# Patient Record
Sex: FEMALE | Race: BLACK OR AFRICAN AMERICAN | NOT HISPANIC OR LATINO | Employment: FULL TIME | URBAN - METROPOLITAN AREA
[De-identification: names, ages, dates, MRNs, and addresses within clinical notes are randomized per-mention and may not be internally consistent; named-entity substitution may affect disease eponyms.]

---

## 2018-05-26 ENCOUNTER — OFFICE VISIT (OUTPATIENT)
Dept: URGENT CARE | Facility: URGENT CARE | Age: 38
End: 2018-05-26
Payer: COMMERCIAL

## 2018-05-26 DIAGNOSIS — N94.6 DYSMENORRHEA: ICD-10-CM

## 2018-05-26 DIAGNOSIS — R35.0 URINARY FREQUENCY: Primary | ICD-10-CM

## 2018-05-26 LAB
ALBUMIN UR-MCNC: NEGATIVE MG/DL
APPEARANCE UR: CLEAR
BACTERIA #/AREA URNS HPF: ABNORMAL /HPF
BILIRUB UR QL STRIP: NEGATIVE
COLOR UR AUTO: YELLOW
GLUCOSE UR STRIP-MCNC: NEGATIVE MG/DL
HGB UR QL STRIP: ABNORMAL
KETONES UR STRIP-MCNC: NEGATIVE MG/DL
LEUKOCYTE ESTERASE UR QL STRIP: NEGATIVE
NITRATE UR QL: NEGATIVE
PH UR STRIP: 5.5 PH (ref 5–7)
RBC #/AREA URNS AUTO: ABNORMAL /HPF
SOURCE: ABNORMAL
SP GR UR STRIP: 1.02 (ref 1–1.03)
UROBILINOGEN UR STRIP-ACNC: 0.2 EU/DL (ref 0.2–1)
WBC #/AREA URNS AUTO: ABNORMAL /HPF

## 2018-05-26 PROCEDURE — 81001 URINALYSIS AUTO W/SCOPE: CPT | Performed by: PHYSICIAN ASSISTANT

## 2018-05-26 PROCEDURE — 99203 OFFICE O/P NEW LOW 30 MIN: CPT | Performed by: NURSE PRACTITIONER

## 2018-05-26 RX ORDER — CABERGOLINE 0.5 MG/1
0.25 TABLET ORAL
COMMUNITY
Start: 2018-01-25

## 2018-05-26 NOTE — PROGRESS NOTES
SUBJECTIVE:   Toan Mcpherson is a 37 year old female presenting with a chief complaint of   Chief Complaint   Patient presents with     Abdominal Pain     abdominal cramping and lower back pain since yesterday.      Urinary Frequency     urinary frequency for three weeks.        She is a new patient of [place].    UTI    Onset of symptoms was 3day(s).  Course of illness is same  Severity moderate  Current and associated symptoms dysuria, frequency and urgency  Treatment and measures tried None  Predisposing factors include none  Patient denies rigors, flank pain, temperature > 101 degrees F., vomiting, taking Coumadin and GFR less than 30 within the last year    Also started her period today     Review of Systems   All other systems reviewed and are negative.      No past medical history on file.  No family history on file.  Current Outpatient Prescriptions   Medication Sig Dispense Refill     cabergoline (DOSTINEX) 0.5 MG tablet Take 0.25 mg by mouth twice a week       Social History   Substance Use Topics     Smoking status: Never Smoker     Smokeless tobacco: Never Used     Alcohol use Not on file       OBJECTIVE  BP (P) 109/75  Pulse (P) 73  Temp (P) 97.9  F (36.6  C) (Oral)  Wt (P) 156 lb 8 oz (71 kg)    Physical Exam   Constitutional: She appears well-developed and well-nourished.   HENT:   Head: Normocephalic.   Eyes: EOM are normal. Pupils are equal, round, and reactive to light.   Cardiovascular: Normal rate, regular rhythm and normal heart sounds.    Pulmonary/Chest: Effort normal and breath sounds normal.   Abdominal: Soft. Bowel sounds are normal. She exhibits no distension. There is tenderness.   Neurological: She is alert.   Psychiatric: She has a normal mood and affect.       Labs:  No results found for this or any previous visit (from the past 24 hour(s)).    X-Ray was not done.    ASSESSMENT:      ICD-10-CM    1. Urinary frequency R35.0 UA with Microscopic reflex to Culture   2. Dysmenorrhea N94.6          PLAN:    Cramping likely due to menstrual cycle, and UA appears to be contaminated. Suggest monitoring, ibuprofen for cramping     Followup:    If not improving or if condition worsens, follow up with your Primary Care Provider    There are no Patient Instructions on file for this visit.

## 2018-05-26 NOTE — MR AVS SNAPSHOT
"              After Visit Summary   2018    Toan BLACK Ky    MRN: [id number]           Patient Information     Date Of Birth          1980        Visit Information        Provider Department      2018 4:40 PM Jane Shafer APRN CNP [facility]        Today's Diagnoses     Urinary frequency    -  1    Dysmenorrhea           Follow-ups after your visit        Who to contact     If you have questions or need follow up information about today's clinic visit or your schedule please contact [facility] directly at [phone].  Normal or non-critical lab and imaging results will be communicated to you by Evernotehart, letter or phone within 4 business days after the clinic has received the results. If you do not hear from us within 7 days, please contact the clinic through Evernotehart or phone. If you have a critical or abnormal lab result, we will notify you by phone as soon as possible.  Submit refill requests through [employer] Mo[employer] or call your pharmacy and they will forward the refill request to us. Please allow 3 business days for your refill to be completed.          Additional Information About Your Visit        MyChart Information     [employer] lets you send messages to your doctor, view your test results, renew your prescriptions, schedule appointments and more. To sign up, go to [email or web address] . Click on \"Log in\" on the left side of the screen, which will take you to the Welcome page. Then click on \"Sign up Now\" on the right side of the page.     You will be asked to enter the access code listed below, as well as some personal information. Please follow the directions to create your username and password.     Your access code is: [email or web address]  Expires: 2018  1:15 PM     Your access code will  in 90 days. If you need help or a new code, please call your [place] clinic or [phone].        Care EveryWhere ID     This is your " Care EveryWhere ID. This could be used by other organizations to access your [facility] medical records  [id number]         Blood Pressure from Last 3 Encounters:   05/26/18 (P) 109/75   04/12/15 100/68    Weight from Last 3 Encounters:   05/26/18 (P) 156 lb 8 oz (71 kg)   04/12/15 142 lb (64.4 kg)              We Performed the Following     UA with Microscopic reflex to Culture          Today's Medication Changes          These changes are accurate as of 5/26/18 11:59 PM.  If you have any questions, ask your nurse or doctor.               Stop taking these medicines if you haven't already. Please contact your care team if you have questions.     amoxicillin 875 MG tablet   Commonly known as:  AMOXIL   Stopped by:  Jane Shafer APRN CNP           fluticasone 50 MCG/ACT spray   Commonly known as:  FLONASE   Stopped by:  Jane Shafer APRN CNP           OVER-THE-COUNTER   Stopped by:  Jane Shafer APRN CNP                    Primary Care Provider Office Phone # Fax #    [facility] [place] [facility] [phone] [address] [place] [address]        Equal Access to Services     ELIAS FONSECA AH: Hadii stormy flood hadasho Soomaali, waaxda luqadaha, qaybta kaalmada adeegyada, madeline tan. So [facility] [phone].    ATENCIÓN: Si habla español, tiene a scanlon disposición servicios gratuitos de asistencia lingüística. Lanceame al [phone].    We comply with applicable federal civil rights laws and [place] laws. We do not discriminate on the basis of race, color, national origin, age, disability, sex, sexual orientation, or gender identity.            Thank you!     Thank you for choosing [facility]  for your care. Our goal is always to provide you with excellent care. Hearing back from our patients is one way we can continue to improve our services. Please take a few minutes to complete the written  survey that you may receive in the mail after your visit with us. Thank you!             Your Updated Medication List - Protect others around you: Learn how to safely use, store and throw away your medicines at www.disposemymeds.org.          This list is accurate as of 5/26/18 11:59 PM.  Always use your most recent med list.                   Brand Name Dispense Instructions for use Diagnosis    cabergoline 0.5 MG tablet    DOSTINEX     Take 0.25 mg by mouth twice a week

## 2021-11-11 ENCOUNTER — APPOINTMENT (OUTPATIENT)
Dept: GENERAL RADIOLOGY | Facility: CLINIC | Age: 41
End: 2021-11-11
Attending: EMERGENCY MEDICINE
Payer: COMMERCIAL

## 2021-11-11 ENCOUNTER — HOSPITAL ENCOUNTER (EMERGENCY)
Facility: CLINIC | Age: 41
Discharge: HOME | End: 2021-11-11
Attending: EMERGENCY MEDICINE | Admitting: EMERGENCY MEDICINE
Payer: COMMERCIAL

## 2021-11-11 VITALS
HEART RATE: 75 BPM | RESPIRATION RATE: 16 BRPM | DIASTOLIC BLOOD PRESSURE: 71 MMHG | OXYGEN SATURATION: 97 % | SYSTOLIC BLOOD PRESSURE: 112 MMHG | TEMPERATURE: 97.8 F | WEIGHT: 165 LBS

## 2021-11-11 DIAGNOSIS — S43.005A SHOULDER DISLOCATION, LEFT, INITIAL ENCOUNTER: ICD-10-CM

## 2021-11-11 PROCEDURE — 96376 TX/PRO/DX INJ SAME DRUG ADON: CPT

## 2021-11-11 PROCEDURE — 73030 X-RAY EXAM OF SHOULDER: CPT | Mod: LT

## 2021-11-11 PROCEDURE — 999N000065 XR SHOULDER 2 VIEW LEFT: Mod: LT

## 2021-11-11 PROCEDURE — 23650 CLTX SHO DSLC W/MNPJ WO ANES: CPT | Mod: LT

## 2021-11-11 PROCEDURE — 250N000011 HC RX IP 250 OP 636: Performed by: EMERGENCY MEDICINE

## 2021-11-11 PROCEDURE — 96374 THER/PROPH/DIAG INJ IV PUSH: CPT

## 2021-11-11 PROCEDURE — 99285 EMERGENCY DEPT VISIT HI MDM: CPT | Mod: 25

## 2021-11-11 RX ORDER — FENTANYL CITRATE 50 UG/ML
100 INJECTION, SOLUTION INTRAMUSCULAR; INTRAVENOUS ONCE
Status: COMPLETED | OUTPATIENT
Start: 2021-11-11 | End: 2021-11-11

## 2021-11-11 RX ORDER — FENTANYL CITRATE 50 UG/ML
50 INJECTION, SOLUTION INTRAMUSCULAR; INTRAVENOUS ONCE
Status: COMPLETED | OUTPATIENT
Start: 2021-11-11 | End: 2021-11-11

## 2021-11-11 RX ADMIN — FENTANYL CITRATE 100 MCG: 50 INJECTION INTRAMUSCULAR; INTRAVENOUS at 21:40

## 2021-11-11 RX ADMIN — FENTANYL CITRATE 50 MCG: 50 INJECTION INTRAMUSCULAR; INTRAVENOUS at 21:03

## 2021-11-11 ASSESSMENT — ENCOUNTER SYMPTOMS
NUMBNESS: 0
WOUND: 0
ARTHRALGIAS: 1

## 2021-11-12 NOTE — ED TRIAGE NOTES
pt picked up child at home today and felt sudden left shoulder pain - limited rom, hx of shoulder dislocations

## 2021-11-12 NOTE — DISCHARGE INSTRUCTIONS

## 2021-11-12 NOTE — ED PROVIDER NOTES
History     Chief Complaint:  Shoulder Pain (pt picked up child at home today and felt sudden left shoulder pain - limited rom, hx of shoulder dislocations)       ADAM Mcpherson is a 40 year old female who presents with left shoulder pain.  She has a history of recurrent shoulder dislocations and was picking up her child when she felt her shoulder dislocate.  She is got a deformity and limited range of motion.  She denies numbness or tingling.  Pain is aggravated by movement.  Alleviated by remaining still.  No further associated symptoms.    ROS:  Review of Systems   Musculoskeletal: Positive for arthralgias.   Skin: Negative for wound.   Neurological: Negative for numbness.   All other systems reviewed and are negative.       Allergies:  No Known Allergies     Medications:    cabergoline (DOSTINEX) 0.5 MG tablet        Past Medical History:    History reviewed. No pertinent past medical history.  There is no problem list on file for this patient.       Past Surgical History:    History reviewed. No pertinent surgical history.     Family History:    family history is not on file.    Social History:   reports that she has never smoked. She has never used smokeless tobacco.  PCP: Clinic, [facility]     Physical Exam   Patient Vitals for the past 24 hrs:   BP Temp Temp src Pulse Resp SpO2 Weight   11/11/21 2051 129/73 -- -- -- -- -- --   11/11/21 2049 -- 97.8  F (36.6  C) Temporal 69 16 100 % 74.8 kg (165 lb)        Physical Exam  General: Alert, interactive in mild to moderate distress  Head:  Scalp is atraumatic  Eyes:  The pupils are equal, round, and reactive to light    EOM's intact    No scleral icterus  ENT:      Nose:  The external nose is normal  Ears:  External ears are normal  Mouth/Throat: The oropharynx is normal      Neck:  Normal range of motion.      There is no rigidity.    Trachea is in the midline         CV:  Regular rate and rhythm    No murmur   Resp:  Breath sounds are  "clear bilaterally    Non-labored, no retractions or accessory muscle use     MS:  Normal strength in all 4 extremities, limited range of motion of the left shoulder with deformity and hollowing of the glenohumeral cup   skin:  Warm and dry, No rash or lesions noted.  Neuro: Strength 5/5 x4.  Sensation intact  In all 4 extremities.      GCS: 15  Psych:  Awake. Alert.  Normal affect.      Appropriate interactions.    Emergency Department Course       Imaging:  XR Shoulder Left 2 Views   Final Result   IMPRESSION: Normal joint spaces and alignment. No fracture.       XR Shoulder Left 3 Views   Final Result   IMPRESSION: There is an anterior dislocation of the left glenohumeral joint. There is no evidence for fracture, but repeat films following reduction recommended.          Report per radiology       Procedures     Reduction     SITE: Left shoulder    PROCEDURE PROVIDER: Dr. Walls    CONSENT: Risks (including but not limited to: decreased respirations, oxygen perfusion, aspiration, hypotension), benefits, and alternatives were discussed with the patient and consent for procedure was obtained.   REDUCTION COMMENTS: The patient was placed in the seated position with her left arm banded to the right at which point the Cunningham technique was used with posterior traction utilizing the patient's own knee until a \"pop\" was felt. The patient's left shoulder then appeared reduced with improved alignment. Post reductions X-rays were obtained and showed good reduction.     PATIENT STATUS: Dislocation alignment improved post procedure and both sensation and circulation are intact. Vital signs remained stable, airway patent, and O2 saturations remained above 95%. Post-procedure, the patient was alert and responds to verbal stimuli. Patient was monitored during recovery and returned to pre-procedure baseline.        Emergency Department Course:  Reviewed:  I reviewed nursing notes and vitals    Assessments:  2045 I obtained " history and examined the patient as noted above.   2130 I rechecked the patient and explained findings.       Interventions:  Medications   fentaNYL (PF) (SUBLIMAZE) injection 50 mcg (50 mcg Intravenous Given 11/11/21 2103)   fentaNYL (PF) (SUBLIMAZE) injection 100 mcg (100 mcg Intravenous Given 11/11/21 2140)        Disposition:  The patient was discharged to home.     Impression & Plan      Medical Decision Making:  Following presentation history and physical examination were performed, radiographs demonstrate shoulder dislocation as noted above.  Subsequently this was reduced utilizing the Cunningham technique as noted above.  Patient tolerated this well.  She was placed in a sling for comfort and have advised Tylenol and ibuprofen for pain.  She will follow-up with her orthopedic surgeon return if new symptoms develop.    Diagnosis:    ICD-10-CM    1. Shoulder dislocation, left, initial encounter  S43.005A         Discharge Medications:  New Prescriptions    No medications on file        11/11/2021   Casey Walls,*      Casey Walls MD  11/11/21 2073

## 2022-12-09 ENCOUNTER — APPOINTMENT (OUTPATIENT)
Dept: GENERAL RADIOLOGY | Facility: CLINIC | Age: 42
End: 2022-12-09
Attending: EMERGENCY MEDICINE
Payer: COMMERCIAL

## 2022-12-09 ENCOUNTER — HOSPITAL ENCOUNTER (EMERGENCY)
Facility: CLINIC | Age: 42
Discharge: HOME | End: 2022-12-09
Attending: EMERGENCY MEDICINE | Admitting: EMERGENCY MEDICINE
Payer: COMMERCIAL

## 2022-12-09 VITALS
DIASTOLIC BLOOD PRESSURE: 62 MMHG | WEIGHT: 145 LBS | OXYGEN SATURATION: 100 % | BODY MASS INDEX: 21.98 KG/M2 | TEMPERATURE: 97.9 F | HEART RATE: 72 BPM | SYSTOLIC BLOOD PRESSURE: 116 MMHG | HEIGHT: 68 IN | RESPIRATION RATE: 18 BRPM

## 2022-12-09 DIAGNOSIS — S43.005A SHOULDER DISLOCATION, LEFT, INITIAL ENCOUNTER: Primary | ICD-10-CM

## 2022-12-09 PROCEDURE — 73030 X-RAY EXAM OF SHOULDER: CPT | Mod: LT

## 2022-12-09 PROCEDURE — 99285 EMERGENCY DEPT VISIT HI MDM: CPT | Mod: 25

## 2022-12-09 PROCEDURE — 250N000013 HC RX MED GY IP 250 OP 250 PS 637: Performed by: EMERGENCY MEDICINE

## 2022-12-09 PROCEDURE — 23650 CLTX SHO DSLC W/MNPJ WO ANES: CPT | Mod: LT

## 2022-12-09 RX ORDER — IBUPROFEN 600 MG/1
600 TABLET, FILM COATED ORAL ONCE
Status: COMPLETED | OUTPATIENT
Start: 2022-12-09 | End: 2022-12-09

## 2022-12-09 RX ORDER — ACETAMINOPHEN 500 MG
1000 TABLET ORAL ONCE
Status: COMPLETED | OUTPATIENT
Start: 2022-12-09 | End: 2022-12-09

## 2022-12-09 RX ADMIN — ACETAMINOPHEN 1000 MG: 500 TABLET ORAL at 08:49

## 2022-12-09 RX ADMIN — IBUPROFEN 600 MG: 600 TABLET ORAL at 08:48

## 2022-12-09 ASSESSMENT — ACTIVITIES OF DAILY LIVING (ADL): ADLS_ACUITY_SCORE: 35

## 2022-12-09 ASSESSMENT — ENCOUNTER SYMPTOMS
ARTHRALGIAS: 1
FEVER: 0

## 2022-12-09 NOTE — ED PROVIDER NOTES
"  History   Chief Complaint:  Shoulder Pain       The history is provided by the patient.      Toan Mcpherson is a right-handed 41 year old female with history of recurrent shoulder dislocations who presents with left shoulder pain. She reports that she was getting dressed this morning when she felt the left shoulder pop out. She denies fevers and feels okay otherwise. The patient has a history of two prior shoulder dislocations. She was last seen on 11/11/22 when she presented with pain after feeling the left shoulder dislocate. Her shoulder was reduced and post reduction x-ray showed good reduction at that time. She did not follow up with an orthopedic specialist after that visit.     Review of Systems   Constitutional: Negative for fever.   Musculoskeletal: Positive for arthralgias (left shoulder pain).   All other systems reviewed and are negative.    Allergies:  The patient has no known allergies.     Medications:  Cabergoline  Procardia  Labetalol    Past Medical History:     Severe pre-eclampsia in third trimester  Elevated hemoglobin A1c  Hyperprolactinemia  Iron deficiency anemia   Hx of blood transfusion   Oligomenorrhea     Past Surgical History:    Implant tooth      Family History:    Stroke  Hypertension  Hyperlipidemia    Social History:  The patient presents to the ED alone  Her  drove her to the ED today   PCP: Clinic, [facility] [place]     Physical Exam     Patient Vitals for the past 24 hrs:   BP Temp Temp src Pulse Resp SpO2 Height Weight   12/09/22 0826 116/62 97.9  F (36.6  C) Temporal 72 18 100 % 1.727 m (5' 8\") 65.8 kg (145 lb)     Physical Exam  General: Alert, appears well-developed and well-nourished. Cooperative.     In mild distress  HEENT:  Head:  Atraumatic  Ears:  External ears are normal  Mouth/Throat:  Oropharynx is without erythema or exudate and mucous membranes are moist.   Eyes:   Conjunctivae normal and EOM are normal. No scleral icterus.  CV:  Normal rate, " regular rhythm, normal heart sounds and radial pulses are 2+ and symmetric.  No murmur.  Resp:  Breath sounds are clear bilaterally    Non-labored, no retractions or accessory muscle use  MS:  No edema.    Left Shoulder:    The Clavicle is intact clinically    The AC joint is non-tender    There is a clinical anterior dislocation palpated    There is no evidence of rotator cuff muscle/tendon disruption    The proximal humerus is mildly tender    The mid and distal shaft of the humerus are non-tender    Axillary nerve function is intact    Brachial and Radial Artery pulse is normal    Median, Ulnar, and Radial Nerve function distally are intact  Skin:  Warm and dry.  No rash or lesions noted.  Neuro:  Alert. Normal strength.  GCS: 15  Psych:  Normal mood and affect.    Emergency Department Course     Imaging:  XR Shoulder Left 2 Views   Final Result   IMPRESSION: No acute fracture or malalignment. There is normal joint   spacing.      ISAI HORTA MD            SYSTEM ID:  [id number]      Report per radiology     [facility]    -Dislocation - Upper Extremity    Date/Time: 12/9/2022 8:45 AM  Performed by: Marcus Villalpando MD  Authorized by: Marcus Villalpando MD     Emergent condition/consent implied      LOCATION     Location:  Shoulder    Shoulder location:  L shoulder    Shoulder dislocation type: anterior      Chronicity:  Recurrent    PRE PROCEDURE ASSESSMENT      Pre-procedure imaging:  None    Distal perfusion: normal      ANESTHESIA (see MAR for exact dosages)      Anesthesia method:  None    PROCEDURE DETAILS      Manipulation performed: yes      Shoulder reduction method:  Direct traction and external rotation    Reduction successful: yes      Reduction confirmed with imaging: yes      Immobilization:  Sling (Shoulder immobilizer)    Supplies used:  Prefabricated splint    POST PROCEDURE DETAILS     Neurological function: normal      Distal perfusion: normal      Range of motion:  improved        PROCEDURE    Patient Tolerance:  Patient tolerated the procedure well with no immediate complications       Emergency Department Course:     Reviewed:  I reviewed nursing notes, vitals and past medical history    Assessments:  0836 I obtained history and examined the patient as noted above. I reduced the shoulder, see procedure note above.   0949 I rechecked the patient and explained findings. At this point I feel that the patient is safe for discharge, and the patient agrees.     Interventions:  0848 ibuprofen 600 mg PO  0849 Tylenol 1000 mg PO    Disposition:  The patient was discharged to home.     Impression & Plan     Medical Decision Making:  Toan Mcpherson is a 41 year old female who presents for evaluation of shoulder pain after feeling the left shoulder pop out of place this morning.  This is consistent by clinical exam with a shoulder dislocation. The dislocation was successfully reduced and a shoulder immobilizer was placed by myself, the fit was checked. Post reduction x-ray shows no acute fracture or malalignment. The neurovascular exam is normal pre and post-reduction.  I will have patient stay in the immobilizer until follow-up with orthopedics in 3-5 days.  The head to toe trauma exam is otherwise normal.    Diagnosis:    ICD-10-CM    1. Shoulder dislocation, left, initial encounter  S43.005A         Scribe Disclosure:  I, Steph Freeman, am serving as a scribe at 8:32 AM on 12/9/2022 to document services personally performed by Marcus Villalpando MD based on my observations and the provider's statements to me.        Marcus Villalpando MD  12/09/22 1146

## 2022-12-09 NOTE — ED TRIAGE NOTES
Getting dressed this morning when L shoulder dislocated. Has happened 3 other times in past.     Triage Assessment     Row Name 12/09/22 0878       Triage Assessment (Adult)    Airway WDL WDL       Skin Circulation/Temperature WDL    Skin Circulation/Temperature WDL WDL       Cardiac WDL    Cardiac WDL WDL       Peripheral/Neurovascular WDL    Peripheral Neurovascular WDL WDL       Cognitive/Neuro/Behavioral WDL    Cognitive/Neuro/Behavioral WDL WDL

## 2022-12-09 NOTE — LETTER
Toan Mcpherson was seen and treated in our emergency department on 12/9/2022.  She may return to work on 12/12/2022.    If you have any questions or concerns, please don't hesitate to call.          Dr. Marcus Villalpando  Emergency Medicine

## 2023-08-05 ENCOUNTER — HOSPITAL ENCOUNTER (EMERGENCY)
Facility: CLINIC | Age: 43
Discharge: HOME | End: 2023-08-05
Attending: EMERGENCY MEDICINE | Admitting: EMERGENCY MEDICINE
Payer: COMMERCIAL

## 2023-08-05 ENCOUNTER — APPOINTMENT (OUTPATIENT)
Dept: GENERAL RADIOLOGY | Facility: CLINIC | Age: 43
End: 2023-08-05
Attending: EMERGENCY MEDICINE
Payer: COMMERCIAL

## 2023-08-05 VITALS
OXYGEN SATURATION: 97 % | HEART RATE: 71 BPM | SYSTOLIC BLOOD PRESSURE: 116 MMHG | TEMPERATURE: 98 F | RESPIRATION RATE: 18 BRPM | DIASTOLIC BLOOD PRESSURE: 77 MMHG

## 2023-08-05 DIAGNOSIS — S43.005A SHOULDER DISLOCATION, LEFT, INITIAL ENCOUNTER: ICD-10-CM

## 2023-08-05 PROCEDURE — 250N000013 HC RX MED GY IP 250 OP 250 PS 637: Performed by: EMERGENCY MEDICINE

## 2023-08-05 PROCEDURE — 99283 EMERGENCY DEPT VISIT LOW MDM: CPT | Mod: 25

## 2023-08-05 PROCEDURE — 999N000065 XR SHOULDER LEFT 1 VIEW: Mod: LT

## 2023-08-05 PROCEDURE — 23650 CLTX SHO DSLC W/MNPJ WO ANES: CPT | Mod: LT

## 2023-08-05 RX ORDER — ACETAMINOPHEN 500 MG
1000 TABLET ORAL ONCE
Status: COMPLETED | OUTPATIENT
Start: 2023-08-05 | End: 2023-08-05

## 2023-08-05 RX ORDER — IBUPROFEN 600 MG/1
600 TABLET, FILM COATED ORAL ONCE
Status: COMPLETED | OUTPATIENT
Start: 2023-08-05 | End: 2023-08-05

## 2023-08-05 RX ADMIN — IBUPROFEN 600 MG: 600 TABLET ORAL at 13:56

## 2023-08-05 RX ADMIN — ACETAMINOPHEN 1000 MG: 500 TABLET, FILM COATED ORAL at 13:56

## 2023-08-05 NOTE — ED PROVIDER NOTES
History     Chief Complaint:  Shoulder Injury     The history is provided by the patient.      Toan Mcpherson is a 42 year old female right-hand-dominant, who presents with a left shoulder dislocation.  The patient states she has dislocated the shoulder 3 other times.  Today she was just reaching over her head to stretch when her left shoulder dislocated.  There is no injury or she did not fall.  She came immediately to the emergency department after trying unsuccessfully to reduce her shoulder at home.  She had physical therapy for the shoulder but is never seen a shoulder specialist.  She is otherwise a healthy patient no concern for illness.    Independent Historian:    Patient    Review of External Notes:  None      Medications:    Dostinex     Past Medical History:    Anemia   Pre-eclampsia   Hyperprolactinemia  Oligomenorrhea     Past Surgical History:    The patient has no pertinent past surgical history.     Physical Exam   Patient Vitals for the past 24 hrs:   BP Temp Temp src Pulse Resp SpO2   08/05/23 1350 116/77 98  F (36.7  C) Temporal 71 18 97 %     Physical Exam  Physical Exam   Constitutional:  Patient is oriented to person, place, and time. They appear well-developed and well-nourished. Mild distress secondary to left shoulder dislocation  HENT:   Eyes:    Conjunctivae normal and EOM are normal. Pupils are equal, round, and reactive to light.   Neck:    Normal range of motion.    Musculoskeletal:  Patient has obvious loss of contour of the left shoulder.  She has normal sensation of the axillary nerve distribution.  She has limited range of motion of the left shoulder due to obvious dislocation.  Neurological:   Patient is alert and oriented to person, place, and time. Patient has normal strength. No cranial nerve deficit or sensory deficit. GCS 15  Skin:   Skin is warm and dry. No rash noted. No erythema.   Psychiatric:   Patient has a normal mood and affect. Patient's behavior is normal. Judgment  and thought content normal.     Emergency Department Course     Imaging:  XR Shoulder Left 1 View   Final Result   IMPRESSION: There is normal joint alignment. No definite fracture is identified. No significant degenerative changes.        Report per radiology    Procedures     Dislocation Reduction   Procedure: Dislocation Reduction  Consent: Verbal from Patient  Risks Discussed: Pain, need for repeat attempts, fracture, neurovascular injury, unsuccessful attempts, and need to go to OR  Universal Protocol: Universal protocol was followed and time out conducted just prior to starting procedure, confirming patient identity, site/side, procedure, patient position, and availability of correct equipment and implants.    Indication: Dislocated Shoulder   Location: Left Shoulder  Anesthesia/Sedation: Anxiolysis: None  Procedure Detail: I manipulated the joint including External rotation    Immobilized with Sling/shoulder immobilizer   Post procedure assessment:  Gross deformity resolved   Patient Status: The patient tolerated the procedure well: Yes. There were no complications.     Emergency Department Course & Assessments:     Interventions:  Medications   acetaminophen (TYLENOL) tablet 1,000 mg (1,000 mg Oral $Given 8/5/23 0826)   ibuprofen (ADVIL/MOTRIN) tablet 600 mg (600 mg Oral $Given 8/5/23 1356)      Assessments:  1400 assessed the patient performed history and physical.  Reduced the patient based on the clinical evaluation showing an atraumatic dislocation.    Independent Interpretation (X-rays, CTs, rhythm strip):  Reviewed the imaging agrees successful reduction of a left shoulder dislocation    Consultations/Discussion of Management or Tests:  None       Social Determinants of Health affecting care:  None     Disposition:  The patient was discharged to home.     Impression & Plan    CMS Diagnoses: None    Medical Decision Making:  Toan Mcpherson is a 42 year old female who presented with an acute right shoulder  anterior, inferior glenohumeral joint dislocation, clinically given her history.  There is no evidence as above of neurovascular compromise.  The joint was reduced as above. She was provided a sling  after reduction and has good pain relief. Post-reduction x-ray shows no complication.  I discussed the natural history of shoulder dislocation, the possibility of underlying soft tissue injury, and precautions against recurrent dislocation. I recommended orthopedic follow-up in 3-5 days.  Referral was provided.    Diagnosis:    ICD-10-CM    1. Shoulder dislocation, left, initial encounter  S43.005A          Scribe Disclosure:  KATHIE BARILLAS, am serving as a scribe at 2:19 PM on 8/5/2023 to document services personally performed by Rain Velázquez MD based on my observations and the provider's statements to me.    8/5/2023   Rain Velázquez MD Bochert, Michelle Ann, MD  08/05/23 1998

## 2023-08-05 NOTE — ED TRIAGE NOTES
Pt presents to the ED after having pain and deformity in her left shoulder after stretching.  Pt reports this shoulder has been dislocated 3 times in the past.     Triage Assessment       Row Name 08/05/23 4364       Triage Assessment (Adult)    Airway WDL WDL       Respiratory WDL    Respiratory WDL WDL       Peripheral/Neurovascular WDL    Peripheral Neurovascular WDL WDL

## 2023-11-14 ENCOUNTER — APPOINTMENT (OUTPATIENT)
Dept: GENERAL RADIOLOGY | Facility: CLINIC | Age: 43
End: 2023-11-14
Attending: EMERGENCY MEDICINE
Payer: COMMERCIAL

## 2023-11-14 ENCOUNTER — HOSPITAL ENCOUNTER (EMERGENCY)
Facility: CLINIC | Age: 43
Discharge: HOME | End: 2023-11-14
Attending: EMERGENCY MEDICINE | Admitting: EMERGENCY MEDICINE
Payer: COMMERCIAL

## 2023-11-14 VITALS
HEIGHT: 68 IN | DIASTOLIC BLOOD PRESSURE: 64 MMHG | TEMPERATURE: 98.8 F | RESPIRATION RATE: 16 BRPM | HEART RATE: 69 BPM | SYSTOLIC BLOOD PRESSURE: 135 MMHG | BODY MASS INDEX: 21.98 KG/M2 | WEIGHT: 145 LBS | OXYGEN SATURATION: 100 %

## 2023-11-14 DIAGNOSIS — S43.005A SHOULDER DISLOCATION, LEFT, INITIAL ENCOUNTER: ICD-10-CM

## 2023-11-14 PROCEDURE — 73030 X-RAY EXAM OF SHOULDER: CPT | Mod: LT

## 2023-11-14 PROCEDURE — 99285 EMERGENCY DEPT VISIT HI MDM: CPT

## 2023-11-14 PROCEDURE — 23650 CLTX SHO DSLC W/MNPJ WO ANES: CPT | Mod: LT

## 2023-11-15 NOTE — DISCHARGE INSTRUCTIONS
Please come back to the ER immediately with any other concerns you have, and to follow with the orthopedic team that report in your paperwork here, as you do need to explore possible surgical options if this keeps happening.  Come back with any other concerns or new symptoms that develop and please be sure to keep the sling on at all times until you are cleared by orthopedist

## 2023-11-15 NOTE — ED TRIAGE NOTES
Patient presents with left shoulder pain after taking off a tight sweat shirt.  She reports hx of multiple shoulder dislocations and states this feels the same.  Deformity noted to left shoulder.     Triage Assessment (Adult)       Row Name 11/14/23 1949          Triage Assessment    Airway WDL WDL        Respiratory WDL    Respiratory WDL WDL        Skin Circulation/Temperature WDL    Skin Circulation/Temperature WDL WDL        Cardiac WDL    Cardiac WDL WDL        Peripheral/Neurovascular WDL    Peripheral Neurovascular WDL WDL        Cognitive/Neuro/Behavioral WDL    Cognitive/Neuro/Behavioral WDL WDL

## 2023-11-15 NOTE — ED PROVIDER NOTES
"  History     Chief Complaint:  Shoulder Injury       HPI   Toan Mcpherson is a 42 year old female who presents with shoulder pain after trying to Switch her.  She states that she frequently will dislocate her shoulder, and believes that is what happened today.  She denies any numbness or tingling, no trauma.  She states that sometimes they are able to put it back without sedation and sometimes they do require sedation.      Independent Historian:   No    Review of External Notes: Yes, I have reviewed her past office visit for an annual physical exam on 30 June of 2023.      Allergies:  No Known Allergies     Medications:    cabergoline (DOSTINEX) 0.5 MG tablet        Past Medical History:    No past medical history on file.    Past Surgical History:    No past surgical history on file.     Family History:    family history is not on file.    Social History:   reports that she has never smoked. She has never used smokeless tobacco. She reports that she does not currently use alcohol.  PCP: Mal, [facility]     Physical Exam   Patient Vitals for the past 24 hrs:   BP Temp Temp src Pulse Resp SpO2 Height Weight   11/14/23 1948 135/64 98.8  F (37.1  C) Oral 69 16 100 % 1.727 m (5' 8\") 65.8 kg (145 lb)        Physical Exam  Vitals: reviewed by me  General: Pt seen on hospital [facility], [facility], cooperative, and alert to conversation  Eyes: Tracking well, clear conjunctiva BL  ENT: MMM, midline trachea.   Lungs: No tachypnea, no accessory muscle use. No respiratory distress.   CV: Rate as above  MSK: no joint effusion.  No evidence of trauma, does have obvious left shoulder pain and a clear dislocation with some deformity.  Left upper extremity is neurovascular intact and warm and well-perfused.  Skin: No rash  Neuro: Clear speech and no facial droop.  Psych: Not RIS, no e/o AH/VH    Emergency Department Course   No results found for this or any previous visit.      Imaging:  XR Shoulder Left G/E 3 Views "   Final Result   IMPRESSION: Normal joint spaces and alignment. No fracture.         Report per radiology    Laboratory:  Labs Ordered and Resulted from Time of ED Arrival to Time of ED Departure - No data to display     [facility]    -Dislocation - Upper Extremity    Date/Time: 11/14/2023 8:06 PM    Performed by: Marino Golden MD  Authorized by: Marino Golden MD    Risks, benefits and alternatives discussed.      LOCATION     Location:  Shoulder    Shoulder location:  L shoulder    Shoulder dislocation type: anterior      Chronicity:  Recurrent    PRE PROCEDURE ASSESSMENT      Pre-procedure imaging:  None    Distal perfusion: normal      ANESTHESIA (see MAR for exact dosages)      Anesthesia method:  None    PROCEDURE DETAILS      Manipulation performed: yes      Shoulder reduction method: Laid prone with hanging arm, holding a saline bag.    Reduction successful: yes      Reduction confirmed with imaging: yes      Immobilization:  Sling    POST PROCEDURE DETAILS     Neurological function: normal      Distal perfusion: normal      Range of motion: improved        PROCEDURE    Patient Tolerance:  Patient tolerated the procedure well with no immediate complications         Emergency Department Course & Assessments:             Interventions:  Medications - No data to display       Independent Interpretation (X-rays, CTs, rhythm strip):  Yes independent review the patient's shoulder x-ray, no obvious fracture noted.          Disposition:  The patient was discharged to home.     Impression & Plan        Medical Decision Making:  This is a very pleasant 42-year-old female presents emergency room with appears to be a shoulder dislocation on her left side.  Her history is highly consistent with this and her physical exam is as well.  By lying her prone and having a hold a saline bag, we were able to achieve complete reduction without medication.   She is neurovascular intact, feels comfortable going home, and tells me that she is already seeing her orthopedist in an attempt to pursue a surgical fix to help with her ligaments.  There is no other trauma, she is highly reliable appearing and I do think that she will come back to the ER if anything changes.  She already has a sling that she brought in from home and she prefers to use this.  I have placed it on and she is doing well after it was placed.  We will plan for discharge as above.    Diagnosis:    ICD-10-CM    1. Shoulder dislocation, left, initial encounter  S43.005A            Discharge Medications:  New Prescriptions    No medications on file          11/14/2023   Marino Golden*        Marino Golden MD  11/14/23 2045

## 2024-02-22 ENCOUNTER — OFFICE VISIT (OUTPATIENT)
Dept: URGENT CARE | Facility: URGENT CARE | Age: 44
End: 2024-02-22
Payer: COMMERCIAL

## 2024-02-22 VITALS
RESPIRATION RATE: 16 BRPM | TEMPERATURE: 97.7 F | SYSTOLIC BLOOD PRESSURE: 121 MMHG | OXYGEN SATURATION: 100 % | DIASTOLIC BLOOD PRESSURE: 76 MMHG | HEART RATE: 63 BPM

## 2024-02-22 DIAGNOSIS — B96.89 BACTERIAL SINUSITIS: Primary | ICD-10-CM

## 2024-02-22 DIAGNOSIS — R51.9 SINUS HEADACHE: ICD-10-CM

## 2024-02-22 DIAGNOSIS — H69.93 DYSFUNCTION OF BOTH EUSTACHIAN TUBES: ICD-10-CM

## 2024-02-22 DIAGNOSIS — J32.9 BACTERIAL SINUSITIS: Primary | ICD-10-CM

## 2024-02-22 PROCEDURE — 99203 OFFICE O/P NEW LOW 30 MIN: CPT | Performed by: PHYSICIAN ASSISTANT

## 2024-02-22 RX ORDER — PREDNISONE 20 MG/1
20 TABLET ORAL 2 TIMES DAILY
Qty: 10 TABLET | Refills: 0 | Status: SHIPPED | OUTPATIENT
Start: 2024-02-22

## 2024-02-22 RX ORDER — ACETAMINOPHEN 500 MG
500-1000 TABLET ORAL EVERY 6 HOURS PRN
Qty: 30 TABLET | Refills: 0 | Status: SHIPPED | OUTPATIENT
Start: 2024-02-22

## 2024-02-22 RX ORDER — CETIRIZINE HYDROCHLORIDE, PSEUDOEPHEDRINE HYDROCHLORIDE 5; 120 MG/1; MG/1
1 TABLET, FILM COATED, EXTENDED RELEASE ORAL 2 TIMES DAILY
Qty: 20 TABLET | Refills: 0 | Status: SHIPPED | OUTPATIENT
Start: 2024-02-22

## 2024-02-22 NOTE — PROGRESS NOTES
Assessment & Plan     Bacterial sinusitis    You have been diagnosed with a bacterial sinus infection. Sinusitis can occur during a cold. It can also happen due to allergies to pollens and other particles in the air.  A sinus infection can sometimes cause fever, headache, and facial pain. There is often green or yellow fluid draining from the nose or into the back of the throat (post-nasal drip). This infection is treated with antibiotics.  Home care  Take the full course of antibiotics as instructed. Don't stop taking them, even when you feel better.  Drink plenty of water, hot tea, and other liquids as directed by the healthcare provider. This may help thin nasal mucus. It also may help your sinuses drain fluids.  Heat may help soothe painful areas of your face. Use a towel soaked in hot water. Or,  the shower and direct the warm spray onto your face. Using a vaporizer along with a menthol rub at night may also help soothe symptoms.     - amoxicillin-clavulanate (AUGMENTIN) 875-125 MG tablet; Take 1 tablet by mouth 2 times daily    Sinus headache    Hot showers, steam  Start on prednisone and tylenol for headache  Pt has a hx of headaches, but this seems to be due to sinusitis  - predniSONE (DELTASONE) 20 MG tablet; Take 1 tablet (20 mg) by mouth 2 times daily  - acetaminophen (TYLENOL) 500 MG tablet; Take 1-2 tablets (500-1,000 mg) by mouth every 6 hours as needed for mild pain    Dysfunction of both eustachian tubes    Start on medication for sinus congestion  - cetirizine-pseudoePHEDrine ER (ZYRTEC-D) 5-120 MG 12 hr tablet; Take 1 tablet by mouth 2 times daily    Review of external notes as documented elsewhere in note      At today's visit with Toan Mcpherson , we discussed results, diagnosis, medications and formulated a plan.  We also discussed red flags for immediate return to clinic/ER, as well as indications for follow up with PCP if not improved in 3 days. Patient understood and agreed to plan.  Toan Mcpherson was discharged with stable vitals and has no further questions.       No follow-ups on file.    Subjective   Toan is a 43 year old, presenting for the following health issues:  Otalgia (Yesterday, right ear, headache)    HPI     Review of Systems  Constitutional, HEENT, cardiovascular, pulmonary, gi and gu systems are negative, except as otherwise noted.      Objective    /76   Pulse 63   Temp 97.7  F (36.5  C)   Resp 16   SpO2 100%   There is no height or weight on file to calculate BMI.  Physical Exam   GENERAL: alert and no distress  EYES: Eyes grossly normal to inspection, PERRL and conjunctivae and sclerae normal  HENT: normal cephalic/atraumatic, ear canals and TM's normal, nasal mucosa edematous , rhinorrhea purulent, and sinuses: maxillary, frontal tenderness on both sides  NECK: no adenopathy, no asymmetry, masses, or scars  RESP: lungs clear to auscultation - no rales, rhonchi or wheezes  CV: regular rate and rhythm, normal S1 S2, no S3 or S4, no murmur, click or rub, no peripheral edema  MS: no gross musculoskeletal defects noted, no edema  SKIN: no suspicious lesions or rashes  NEURO: Normal strength and tone, mentation intact and speech normal  PSYCH: mentation appears normal, affect normal/bright          Signed Electronically by: Rolo Abbott, [facility], [place]